# Patient Record
Sex: FEMALE | Race: WHITE | NOT HISPANIC OR LATINO | Employment: FULL TIME | URBAN - METROPOLITAN AREA
[De-identification: names, ages, dates, MRNs, and addresses within clinical notes are randomized per-mention and may not be internally consistent; named-entity substitution may affect disease eponyms.]

---

## 2014-12-15 LAB
EXTERNAL HIV CONFIRMATION: NORMAL
EXTERNAL HIV SCREEN: NORMAL
HCV AB SER-ACNC: NORMAL

## 2021-06-21 ENCOUNTER — OFFICE VISIT (OUTPATIENT)
Dept: FAMILY MEDICINE CLINIC | Facility: CLINIC | Age: 61
End: 2021-06-21
Payer: OTHER MISCELLANEOUS

## 2021-06-21 VITALS
HEIGHT: 64 IN | RESPIRATION RATE: 16 BRPM | SYSTOLIC BLOOD PRESSURE: 122 MMHG | TEMPERATURE: 97.5 F | DIASTOLIC BLOOD PRESSURE: 68 MMHG | WEIGHT: 113.2 LBS | BODY MASS INDEX: 19.33 KG/M2
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
## 2021-06-21 PROBLEM — S81.011A LACERATION OF RIGHT KNEE: Status: ACTIVE | Noted: 2021-06-21

## 2021-06-21 PROCEDURE — 99214 OFFICE O/P EST MOD 30 MIN: CPT | Performed by: FAMILY MEDICINE

## 2021-06-21 RX ORDER — FEXOFENADINE HCL 180 MG/1
180 TABLET ORAL DAILY
COMMUNITY

## 2021-06-21 RX ORDER — SODIUM FLUORIDE 1.1 G/100G
GEL, DENTIFRICE ORAL
COMMUNITY
Start: 2021-04-12

## 2021-06-21 RX ORDER — MONTELUKAST SODIUM 10 MG/1
10 TABLET ORAL DAILY
COMMUNITY
Start: 2021-04-30 | End: 2021-06-21 | Stop reason: ALTCHOICE

## 2021-06-21 RX ORDER — MONTELUKAST SODIUM 10 MG/1
TABLET ORAL
COMMUNITY
Start: 2021-04-30 | End: 2021-07-19 | Stop reason: SDUPTHER
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
## 2021-06-25 ENCOUNTER — OFFICE VISIT (OUTPATIENT)
Dept: FAMILY MEDICINE CLINIC | Facility: CLINIC | Age: 61
End: 2021-06-25
Payer: OTHER MISCELLANEOUS

## 2021-06-25 VITALS
BODY MASS INDEX: 19.63 KG/M2 | OXYGEN SATURATION: 95 % | WEIGHT: 115 LBS | HEIGHT: 64 IN | SYSTOLIC BLOOD PRESSURE: 130 MMHG | TEMPERATURE: 97 F | DIASTOLIC BLOOD PRESSURE: 80 MMHG | RESPIRATION RATE: 18 BRPM | HEART RATE: 67 BPM
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
## 2021-06-25 DIAGNOSIS — S81.011D LACERATION OF RIGHT KNEE, SUBSEQUENT ENCOUNTER: Primary | ICD-10-CM

## 2021-06-25 PROCEDURE — 99213 OFFICE O/P EST LOW 20 MIN: CPT | Performed by: FAMILY MEDICINE
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
## 2021-06-25 NOTE — ASSESSMENT & PLAN NOTE
Laceration of right needed  Using sterile instruments I removed 3 sutures from laceration of right knee  Patient with fairly well preserved range of motion with knee  She is aware to not kneel on her knee and will keep the area clean and dry
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
## 2021-06-30 ENCOUNTER — TELEPHONE (OUTPATIENT)
Dept: ADMINISTRATIVE | Facility: OTHER | Age: 61
End: 2021-06-30

## 2021-06-30 NOTE — TELEPHONE ENCOUNTER
Upon review of the In Basket request we were able to locate, review, and update the patient chart as requested for Hepatitis C  and HIV  Any additional questions or concerns should be emailed to the Practice Liaisons via [email or web address] email, please do not reply via In Basket      Thank you  Kaycee Alcantar MA

## 2021-06-30 NOTE — TELEPHONE ENCOUNTER
----- Message from Shanda Dsouza sent at 6/29/2021  2:50 PM EDT -----  Regarding: care gap request-HIV & Hep C  06/29/21 2:50 PM    Hello, our patient attached above has had Hepatitis C completed/performed  Please assist in updating the patient chart by pulling the Care Everywhere (CE) document  The date of service is 12/15/2014        06/29/21 2:51 PM    Hello, our patient Vicky Garcia has had HIV completed/performed  Please assist in updating the patient chart by pulling the Care Everywhere (CE) document  The date of service is 12/15/2014       Thank you,  Kati Singh MA  [facility]

## 2021-07-02 ENCOUNTER — OFFICE VISIT (OUTPATIENT)
Dept: FAMILY MEDICINE CLINIC | Facility: CLINIC | Age: 61
End: 2021-07-02
Payer: OTHER MISCELLANEOUS

## 2021-07-02 VITALS
WEIGHT: 116.6 LBS | SYSTOLIC BLOOD PRESSURE: 122 MMHG | HEIGHT: 64 IN | RESPIRATION RATE: 16 BRPM | BODY MASS INDEX: 19.91 KG/M2 | TEMPERATURE: 97.6 F | DIASTOLIC BLOOD PRESSURE: 70 MMHG
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
## 2021-07-02 PROCEDURE — 99213 OFFICE O/P EST LOW 20 MIN: CPT | Performed by: FAMILY MEDICINE
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
## 2021-07-19 ENCOUNTER — OFFICE VISIT (OUTPATIENT)
Dept: FAMILY MEDICINE CLINIC | Facility: CLINIC | Age: 61
End: 2021-07-19
Payer: OTHER MISCELLANEOUS

## 2021-07-19 VITALS
SYSTOLIC BLOOD PRESSURE: 132 MMHG | OXYGEN SATURATION: 99 % | TEMPERATURE: 97.5 F | HEIGHT: 64 IN | WEIGHT: 113 LBS | RESPIRATION RATE: 16 BRPM | HEART RATE: 100 BPM | DIASTOLIC BLOOD PRESSURE: 80 MMHG | BODY MASS INDEX: 19.29 KG/M2

## 2021-07-19 DIAGNOSIS — J30.9 ALLERGIC RHINITIS, UNSPECIFIED SEASONALITY, UNSPECIFIED TRIGGER: Primary | ICD-10-CM
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
## 2021-07-19 PROCEDURE — 99213 OFFICE O/P EST LOW 20 MIN: CPT | Performed by: FAMILY MEDICINE

## 2021-07-19 RX ORDER — MELOXICAM 15 MG/1
15 TABLET ORAL DAILY
Qty: 90 TABLET | Refills: 1 | Status: SHIPPED | OUTPATIENT
Start: 2021-07-19 | End: 2022-02-10

## 2021-07-19 RX ORDER — MONTELUKAST SODIUM 10 MG/1
10 TABLET ORAL DAILY
Qty: 90 TABLET | Refills: 1 | Status: SHIPPED | OUTPATIENT
Start: 2021-07-19 | End: 2021-12-22
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
## 2021-07-26 ENCOUNTER — OFFICE VISIT (OUTPATIENT)
Dept: FAMILY MEDICINE CLINIC | Facility: CLINIC | Age: 61
End: 2021-07-26
Payer: COMMERCIAL

## 2021-07-26 VITALS
HEIGHT: 64 IN | TEMPERATURE: 97.6 F | BODY MASS INDEX: 19.29 KG/M2 | DIASTOLIC BLOOD PRESSURE: 70 MMHG | RESPIRATION RATE: 16 BRPM | WEIGHT: 113 LBS | SYSTOLIC BLOOD PRESSURE: 120 MMHG
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
## 2021-07-26 PROCEDURE — 99213 OFFICE O/P EST LOW 20 MIN: CPT | Performed by: FAMILY MEDICINE

## 2021-07-26 PROCEDURE — 3008F BODY MASS INDEX DOCD: CPT | Performed by: FAMILY MEDICINE
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
## 2021-12-22 DIAGNOSIS — J30.9 ALLERGIC RHINITIS, UNSPECIFIED SEASONALITY, UNSPECIFIED TRIGGER: ICD-10-CM

## 2021-12-22 RX ORDER — MONTELUKAST SODIUM 10 MG/1
TABLET ORAL
Qty: 90 TABLET | Refills: 1 | Status: SHIPPED | OUTPATIENT
Start: 2021-12-22 | End: 2022-01-12

## 2022-01-01 ENCOUNTER — TELEMEDICINE (OUTPATIENT)
Dept: FAMILY MEDICINE CLINIC | Facility: CLINIC | Age: 62
End: 2022-01-01

## 2022-01-01 ENCOUNTER — HOSPITAL ENCOUNTER (INPATIENT)
Facility: HOSPITAL | Age: 62
LOS: 1 days | End: 2022-12-21
Attending: INTERNAL MEDICINE | Admitting: ANESTHESIOLOGY

## 2022-01-01 ENCOUNTER — APPOINTMENT (INPATIENT)
Dept: NON INVASIVE DIAGNOSTICS | Facility: HOSPITAL | Age: 62
End: 2022-01-01

## 2022-01-01 ENCOUNTER — TELEPHONE (OUTPATIENT)
Dept: FAMILY MEDICINE CLINIC | Facility: CLINIC | Age: 62
End: 2022-01-01

## 2022-01-01 ENCOUNTER — APPOINTMENT (EMERGENCY)
Dept: RADIOLOGY | Facility: HOSPITAL | Age: 62
End: 2022-01-01

## 2022-01-01 ENCOUNTER — APPOINTMENT (INPATIENT)
Dept: RADIOLOGY | Facility: HOSPITAL | Age: 62
End: 2022-01-01

## 2022-01-01 ENCOUNTER — HOSPITAL ENCOUNTER (EMERGENCY)
Facility: HOSPITAL | Age: 62
End: 2022-12-21
Attending: EMERGENCY MEDICINE

## 2022-01-01 VITALS
SYSTOLIC BLOOD PRESSURE: 88 MMHG | DIASTOLIC BLOOD PRESSURE: 64 MMHG | HEART RATE: 77 BPM | WEIGHT: 128.97 LBS | TEMPERATURE: 92.7 F | RESPIRATION RATE: 18 BRPM | BODY MASS INDEX: 22.14 KG/M2

## 2022-01-01 VITALS — HEIGHT: 64 IN | WEIGHT: 118 LBS | BODY MASS INDEX: 20.14 KG/M2

## 2022-01-01 VITALS — OXYGEN SATURATION: 56 %

## 2022-01-01 DIAGNOSIS — U07.1 COVID: ICD-10-CM

## 2022-01-01 DIAGNOSIS — U07.1 COVID-19 VIRUS INFECTION: Primary | ICD-10-CM

## 2022-01-01 DIAGNOSIS — R07.9 CHEST PAIN: ICD-10-CM

## 2022-01-01 DIAGNOSIS — I21.3 STEMI (ST ELEVATION MYOCARDIAL INFARCTION) (HCC): Primary | ICD-10-CM
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
## 2022-01-01 DIAGNOSIS — R09.89 ABSENT PEDAL PULSES: ICD-10-CM

## 2022-01-01 LAB
ALBUMIN SERPL BCP-MCNC: 2.2 G/DL (ref 3.5–5)
ALP SERPL-CCNC: 77 U/L (ref 46–116)
ALT SERPL W P-5'-P-CCNC: 58 U/L (ref 12–78)
ANION GAP SERPL CALCULATED.3IONS-SCNC: 22 MMOL/L (ref 4–13)
AST SERPL W P-5'-P-CCNC: 124 U/L (ref 5–45)
BASE EXCESS BLDA CALC-SCNC: -14 MMOL/L (ref -2–3)
BASE EXCESS BLDA CALC-SCNC: -22 MMOL/L (ref -2–3)
BASE EXCESS BLDA CALC-SCNC: -23 MMOL/L (ref -2–3)
BILIRUB SERPL-MCNC: 0.3 MG/DL (ref 0.2–1)
BUN SERPL-MCNC: 26 MG/DL (ref 5–25)
CA-I BLD-SCNC: 1 MMOL/L (ref 1.12–1.32)
CA-I BLD-SCNC: 1.04 MMOL/L (ref 1.12–1.32)
CA-I BLD-SCNC: 1.14 MMOL/L (ref 1.12–1.32)
CALCIUM ALBUM COR SERPL-MCNC: 9.6 MG/DL (ref 8.3–10.1)
CALCIUM SERPL-MCNC: 8.2 MG/DL (ref 8.3–10.1)
CARDIAC TROPONIN I PNL SERPL HS: ABNORMAL NG/L
CATH EF QUANTITATIVE: 30 %
CHLORIDE SERPL-SCNC: 95 MMOL/L (ref 96–108)
CK MB SERPL-MCNC: 115.5 NG/ML (ref 0–5)
CK MB SERPL-MCNC: 18.9 % (ref 0–2.5)
CK SERPL-CCNC: 612 U/L (ref 26–192)
CO2 SERPL-SCNC: 10 MMOL/L (ref 21–32)
CREAT SERPL-MCNC: 2.08 MG/DL (ref 0.6–1.3)
ERYTHROCYTE [DISTWIDTH] IN BLOOD BY AUTOMATED COUNT: 13.2 % (ref 11.6–15.1)
EST. AVERAGE GLUCOSE BLD GHB EST-MCNC: 111 MG/DL
FIO2 GAS DIL.REBREATH: 100 L
GFR SERPL CREATININE-BSD FRML MDRD: 24 ML/MIN/1.73SQ M
GLUCOSE SERPL-MCNC: 316 MG/DL (ref 65–140)
GLUCOSE SERPL-MCNC: 411 MG/DL (ref 65–140)
GLUCOSE SERPL-MCNC: 472 MG/DL (ref 65–140)
GLUCOSE SERPL-MCNC: 476 MG/DL (ref 65–140)
HBA1C MFR BLD: 5.5 %
HCO3 BLDA-SCNC: 11.8 MMOL/L (ref 24–30)
HCO3 BLDA-SCNC: 12.8 MMOL/L (ref 22–28)
HCO3 BLDA-SCNC: 6.3 MMOL/L (ref 24–30)
HCT VFR BLD AUTO: 44 % (ref 34.8–46.1)
HCT VFR BLD CALC: 31 % (ref 34.8–46.1)
HCT VFR BLD CALC: 37 % (ref 34.8–46.1)
HCT VFR BLD CALC: 43 % (ref 34.8–46.1)
HGB BLD-MCNC: 13.8 G/DL (ref 11.5–15.4)
HGB BLDA-MCNC: 10.5 G/DL (ref 11.5–15.4)
HGB BLDA-MCNC: 12.6 G/DL (ref 11.5–15.4)
HGB BLDA-MCNC: 14.6 G/DL (ref 11.5–15.4)
INR PPP: 1.15 (ref 0.84–1.19)
KCT BLD-ACNC: 252 SEC (ref 89–137)
LACTATE SERPL-SCNC: 14.1 MMOL/L (ref 0.5–2)
MAGNESIUM SERPL-MCNC: 2.8 MG/DL (ref 1.6–2.6)
MCH RBC QN AUTO: 30.3 PG (ref 26.8–34.3)
MCHC RBC AUTO-ENTMCNC: 31.4 G/DL (ref 31.4–37.4)
MCV RBC AUTO: 97 FL (ref 82–98)
PCO2 BLD: 14 MMOL/L (ref 21–32)
PCO2 BLD: 14 MMOL/L (ref 21–32)
PCO2 BLD: 24 MM HG (ref 42–50)
PCO2 BLD: 31.8 MM HG (ref 36–44)
PCO2 BLD: 61.9 MM HG (ref 42–50)
PCO2 BLD: 7 MMOL/L (ref 21–32)
PH BLD: 6.89 [PH] (ref 7.3–7.4)
PH BLD: 7.02 [PH] (ref 7.3–7.4)
PH BLD: 7.21 [PH] (ref 7.35–7.45)
PLATELET # BLD AUTO: 251 THOUSANDS/UL (ref 149–390)
PMV BLD AUTO: 9.3 FL (ref 8.9–12.7)
PO2 BLD: 118 MM HG (ref 75–129)
PO2 BLD: 221 MM HG (ref 35–45)
PO2 BLD: 31 MM HG (ref 35–45)
POTASSIUM BLD-SCNC: 4 MMOL/L (ref 3.5–5.3)
POTASSIUM BLD-SCNC: 4.3 MMOL/L (ref 3.5–5.3)
POTASSIUM BLD-SCNC: 4.4 MMOL/L (ref 3.5–5.3)
POTASSIUM SERPL-SCNC: 4.4 MMOL/L (ref 3.5–5.3)
PROT SERPL-MCNC: 4.9 G/DL (ref 6.4–8.4)
PROTHROMBIN TIME: 14.9 SECONDS (ref 11.6–14.5)
RBC # BLD AUTO: 4.56 MILLION/UL (ref 3.81–5.12)
SAO2 % BLD FROM PO2: 27 % (ref 60–85)
SAO2 % BLD FROM PO2: 98 % (ref 60–85)
SAO2 % BLD FROM PO2: 99 % (ref 60–85)
SODIUM BLD-SCNC: 123 MMOL/L (ref 136–145)
SODIUM BLD-SCNC: 127 MMOL/L (ref 136–145)
SODIUM BLD-SCNC: 129 MMOL/L (ref 136–145)
SODIUM SERPL-SCNC: 127 MMOL/L (ref 135–147)
SPECIMEN SOURCE: ABNORMAL
WBC # BLD AUTO: 10.37 THOUSAND/UL (ref 4.31–10.16)

## 2022-01-01 PROCEDURE — 5A12012 PERFORMANCE OF CARDIAC OUTPUT, SINGLE, MANUAL: ICD-10-PCS | Performed by: INTERNAL MEDICINE

## 2022-01-01 PROCEDURE — 4A023N7 MEASUREMENT OF CARDIAC SAMPLING AND PRESSURE, LEFT HEART, PERCUTANEOUS APPROACH: ICD-10-PCS | Performed by: INTERNAL MEDICINE

## 2022-01-01 PROCEDURE — B2111ZZ FLUOROSCOPY OF MULTIPLE CORONARY ARTERIES USING LOW OSMOLAR CONTRAST: ICD-10-PCS | Performed by: INTERNAL MEDICINE

## 2022-01-01 RX ORDER — ETOMIDATE 2 MG/ML
INJECTION INTRAVENOUS CODE/TRAUMA/SEDATION MEDICATION
Status: COMPLETED | OUTPATIENT
Start: 2022-01-01 | End: 2022-01-01

## 2022-01-01 RX ORDER — LIDOCAINE HYDROCHLORIDE 10 MG/ML
INJECTION, SOLUTION EPIDURAL; INFILTRATION; INTRACAUDAL; PERINEURAL CODE/TRAUMA/SEDATION MEDICATION
Status: DISCONTINUED | OUTPATIENT
Start: 2022-01-01 | End: 2022-01-01 | Stop reason: HOSPADM

## 2022-01-01 RX ORDER — HYDROCODONE BITARTRATE AND ACETAMINOPHEN 5; 325 MG/1; MG/1
1 TABLET ORAL EVERY 4 HOURS PRN
Status: DISCONTINUED | OUTPATIENT
Start: 2022-01-01 | End: 2022-01-01 | Stop reason: HOSPADM

## 2022-01-01 RX ORDER — NITROGLYCERIN 0.4 MG/1
0.4 TABLET SUBLINGUAL
Status: DISCONTINUED | OUTPATIENT
Start: 2022-01-01 | End: 2022-01-01 | Stop reason: HOSPADM

## 2022-01-01 RX ORDER — DIPHENOXYLATE HYDROCHLORIDE AND ATROPINE SULFATE 2.5; .025 MG/1; MG/1
1 TABLET ORAL 4 TIMES DAILY PRN
Qty: 20 TABLET | Refills: 0 | Status: SHIPPED | OUTPATIENT
Start: 2022-01-01

## 2022-01-01 RX ORDER — METOPROLOL TARTRATE 50 MG/1
25 TABLET, FILM COATED ORAL EVERY 12 HOURS SCHEDULED
Status: DISCONTINUED | OUTPATIENT
Start: 2022-01-01 | End: 2022-01-01

## 2022-01-01 RX ORDER — SODIUM BICARBONATE 84 MG/ML
INJECTION, SOLUTION INTRAVENOUS CODE/TRAUMA/SEDATION MEDICATION
Status: COMPLETED | OUTPATIENT
Start: 2022-01-01 | End: 2022-01-01

## 2022-01-01 RX ORDER — HEPARIN SODIUM 1000 [USP'U]/ML
INJECTION, SOLUTION INTRAVENOUS; SUBCUTANEOUS CODE/TRAUMA/SEDATION MEDICATION
Status: DISCONTINUED | OUTPATIENT
Start: 2022-01-01 | End: 2022-01-01 | Stop reason: HOSPADM

## 2022-01-01 RX ORDER — NIRMATRELVIR AND RITONAVIR 300-100 MG
3 KIT ORAL 2 TIMES DAILY
Qty: 30 TABLET | Refills: 0 | Status: SHIPPED | OUTPATIENT
Start: 2022-01-01 | End: 2022-12-25

## 2022-01-01 RX ORDER — SODIUM CHLORIDE 9 MG/ML
125 INJECTION, SOLUTION INTRAVENOUS CONTINUOUS
Status: DISPENSED | OUTPATIENT
Start: 2022-01-01 | End: 2022-01-01

## 2022-01-01 RX ORDER — ACETAMINOPHEN 325 MG/1
650 TABLET ORAL EVERY 4 HOURS PRN
Status: DISCONTINUED | OUTPATIENT
Start: 2022-01-01 | End: 2022-01-01 | Stop reason: HOSPADM

## 2022-01-01 RX ORDER — ATORVASTATIN CALCIUM 40 MG/1
40 TABLET, FILM COATED ORAL
Status: DISCONTINUED | OUTPATIENT
Start: 2022-01-01 | End: 2022-01-01 | Stop reason: HOSPADM

## 2022-01-01 RX ORDER — MELOXICAM 15 MG/1
TABLET ORAL
Qty: 90 TABLET | Refills: 1 | Status: SHIPPED | OUTPATIENT
Start: 2022-01-01

## 2022-01-01 RX ORDER — PHENYLEPHRINE HYDROCHLORIDE 10 MG/ML
INJECTION INTRAVENOUS
Status: DISCONTINUED
Start: 2022-01-01 | End: 2022-01-01 | Stop reason: HOSPADM

## 2022-01-01 RX ORDER — SUCCINYLCHOLINE/SOD CL,ISO/PF 100 MG/5ML
SYRINGE (ML) INTRAVENOUS CODE/TRAUMA/SEDATION MEDICATION
Status: COMPLETED | OUTPATIENT
Start: 2022-01-01 | End: 2022-01-01

## 2022-01-01 RX ORDER — HEPARIN SODIUM 5000 [USP'U]/ML
5000 INJECTION, SOLUTION INTRAVENOUS; SUBCUTANEOUS EVERY 8 HOURS SCHEDULED
Status: DISCONTINUED | OUTPATIENT
Start: 2022-01-01 | End: 2022-01-01 | Stop reason: HOSPADM

## 2022-01-01 RX ORDER — FENTANYL CITRATE 50 UG/ML
INJECTION, SOLUTION INTRAMUSCULAR; INTRAVENOUS
Status: DISCONTINUED
Start: 2022-01-01 | End: 2022-01-01 | Stop reason: HOSPADM

## 2022-01-01 RX ORDER — EPINEPHRINE 0.1 MG/ML
SYRINGE (ML) INJECTION CODE/TRAUMA/SEDATION MEDICATION
Status: COMPLETED | OUTPATIENT
Start: 2022-01-01 | End: 2022-01-01

## 2022-01-01 RX ORDER — EPINEPHRINE 1 MG/ML
INJECTION, SOLUTION, CONCENTRATE INTRAVENOUS
Status: DISCONTINUED
Start: 2022-01-01 | End: 2022-01-01 | Stop reason: HOSPADM

## 2022-01-01 RX ORDER — ASPIRIN 81 MG/1
81 TABLET, CHEWABLE ORAL DAILY
Status: DISCONTINUED | OUTPATIENT
Start: 2022-12-22 | End: 2022-01-01 | Stop reason: HOSPADM

## 2022-01-01 RX ADMIN — EPINEPHRINE 1 MG: 0.1 INJECTION INTRACARDIAC; INTRAVENOUS at 10:50

## 2022-01-01 RX ADMIN — EPINEPHRINE 1 MG: 0.1 INJECTION INTRACARDIAC; INTRAVENOUS at 10:59

## 2022-01-01 RX ADMIN — EPINEPHRINE 1 MG: 0.1 INJECTION INTRACARDIAC; INTRAVENOUS at 11:04

## 2022-01-01 RX ADMIN — EPINEPHRINE 1 MG: 0.1 INJECTION INTRACARDIAC; INTRAVENOUS at 11:02

## 2022-01-01 RX ADMIN — Medication 100 MG: at 10:59

## 2022-01-01 RX ADMIN — EPINEPHRINE 1 MG: 0.1 INJECTION INTRACARDIAC; INTRAVENOUS at 10:56

## 2022-01-01 RX ADMIN — SODIUM BICARBONATE 50 MEQ: 84 INJECTION, SOLUTION INTRAVENOUS at 10:51

## 2022-01-01 RX ADMIN — ETOMIDATE 20 MG: 20 INJECTION, SOLUTION INTRAVENOUS at 10:58

## 2022-01-01 RX ADMIN — SODIUM BICARBONATE 50 MEQ: 84 INJECTION, SOLUTION INTRAVENOUS at 11:00

## 2022-01-01 RX ADMIN — SODIUM CHLORIDE 125 ML/HR: 0.9 INJECTION, SOLUTION INTRAVENOUS at 10:10

## 2022-01-01 RX ADMIN — EPINEPHRINE 1 MG: 0.1 INJECTION INTRACARDIAC; INTRAVENOUS at 10:52

## 2022-01-01 RX ADMIN — SODIUM BICARBONATE 50 MEQ: 84 INJECTION, SOLUTION INTRAVENOUS at 10:53

## 2022-01-01 RX ADMIN — NOREPINEPHRINE BITARTRATE 4 MCG/MIN: 1 INJECTION INTRAVENOUS at 10:23

## 2022-01-12 DIAGNOSIS — J30.9 ALLERGIC RHINITIS, UNSPECIFIED SEASONALITY, UNSPECIFIED TRIGGER: ICD-10-CM

## 2022-01-12 RX ORDER — MONTELUKAST SODIUM 10 MG/1
TABLET ORAL
Qty: 90 TABLET | Refills: 1 | Status: SHIPPED | OUTPATIENT
Start: 2022-01-12 | End: 2022-01-13

## 2022-01-13 DIAGNOSIS — J30.9 ALLERGIC RHINITIS, UNSPECIFIED SEASONALITY, UNSPECIFIED TRIGGER: ICD-10-CM

## 2022-01-13 RX ORDER — MONTELUKAST SODIUM 10 MG/1
TABLET ORAL
Qty: 90 TABLET | Refills: 1 | Status: SHIPPED | OUTPATIENT
Start: 2022-01-13 | End: 2022-01-14

## 2022-01-14 DIAGNOSIS — J30.9 ALLERGIC RHINITIS, UNSPECIFIED SEASONALITY, UNSPECIFIED TRIGGER: ICD-10-CM

## 2022-01-14 RX ORDER — MONTELUKAST SODIUM 10 MG/1
TABLET ORAL
Qty: 90 TABLET | Refills: 1 | Status: SHIPPED | OUTPATIENT
Start: 2022-01-14 | End: 2022-01-17

## 2022-01-15 DIAGNOSIS — J30.9 ALLERGIC RHINITIS, UNSPECIFIED SEASONALITY, UNSPECIFIED TRIGGER: ICD-10-CM

## 2022-01-17 RX ORDER — MONTELUKAST SODIUM 10 MG/1
TABLET ORAL
Qty: 90 TABLET | Refills: 1 | Status: SHIPPED | OUTPATIENT
Start: 2022-01-17
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
## 2022-02-10 RX ORDER — MELOXICAM 15 MG/1
TABLET ORAL
Qty: 90 TABLET | Refills: 1 | Status: SHIPPED | OUTPATIENT
Start: 2022-02-10 | End: 2022-02-17
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
## 2022-02-17 RX ORDER — MELOXICAM 15 MG/1
TABLET ORAL
Qty: 90 TABLET | Refills: 1 | Status: SHIPPED | OUTPATIENT
Start: 2022-02-17 | End: 2022-07-31

## 2022-05-27 ENCOUNTER — OFFICE VISIT (OUTPATIENT)
Dept: URGENT CARE | Facility: CLINIC | Age: 62
End: 2022-05-27
Payer: COMMERCIAL

## 2022-05-27 VITALS
DIASTOLIC BLOOD PRESSURE: 68 MMHG | TEMPERATURE: 98.9 F | HEART RATE: 78 BPM | OXYGEN SATURATION: 99 % | SYSTOLIC BLOOD PRESSURE: 129 MMHG | RESPIRATION RATE: 18 BRPM

## 2022-05-27 DIAGNOSIS — L03.011 PARONYCHIA OF FINGER OF RIGHT HAND: Primary | ICD-10-CM

## 2022-05-27 DIAGNOSIS — L02.91 ABSCESS: ICD-10-CM

## 2022-05-27 PROCEDURE — 99213 OFFICE O/P EST LOW 20 MIN: CPT | Performed by: PHYSICIAN ASSISTANT

## 2022-05-27 RX ORDER — CLINDAMYCIN HYDROCHLORIDE 300 MG/1
300 CAPSULE ORAL 4 TIMES DAILY
Qty: 40 CAPSULE | Refills: 0 | Status: SHIPPED | OUTPATIENT
Start: 2022-05-27 | End: 2022-06-06

## 2022-05-27 NOTE — PROGRESS NOTES
[facility]        NAME: Angelica Mukherjee is a 59 y o  female  : 1960    MRN: [id number]  DATE: May 27, 2022  TIME: 7:03 PM    /68   Pulse 78   Temp 98 9 °F (37 2 °C)   Resp 18   SpO2 99%     Assessment and Plan   Paronychia of finger of right hand [L03 011]  1  Paronychia of finger of right hand  clindamycin (CLEOCIN) 300 MG capsule   2  Abscess  clindamycin (CLEOCIN) 300 MG capsule         Patient Instructions       Follow up with PCP in 3-5 days  Proceed to  ER if symptoms worsen  Chief Complaint   No chief complaint on file  History of Present Illness       Pt with right 3rd finger pain and swelling for several days also with bump/boil on her back x 2 days       Review of Systems   Review of Systems   Constitutional: Negative  HENT: Negative  Eyes: Negative  Respiratory: Negative  Cardiovascular: Negative  Gastrointestinal: Negative  Endocrine: Negative  Genitourinary: Negative  Musculoskeletal: Negative  Skin: Positive for wound  Allergic/Immunologic: Negative  Neurological: Negative  Hematological: Negative  Psychiatric/Behavioral: Negative  All other systems reviewed and are negative          Current Medications       Current Outpatient Medications:     clindamycin (CLEOCIN) 300 MG capsule, Take 1 capsule (300 mg total) by mouth 4 (four) times a day for 10 days, Disp: 40 capsule, Rfl: 0    Denta 5000 Plus 1 1 % CREA, Use as directed, Disp: , Rfl:     fexofenadine (ALLEGRA) 180 MG tablet, Take 180 mg by mouth daily, Disp: , Rfl:     meloxicam (MOBIC) 15 mg tablet, TAKE ONE TABLET BY MOUTH EVERY DAY, Disp: 90 tablet, Rfl: 1    montelukast (SINGULAIR) 10 mg tablet, TAKE ONE TABLET BY MOUTH EVERY DAY, Disp: 90 tablet, Rfl: 1    Current Allergies     Allergies as of 2022 - Reviewed 2022   Allergen Reaction Noted    Penicillins Other (See Comments) 2011            The following portions of the patient's history were reviewed and updated as appropriate: allergies, current medications, past family history, past medical history, past social history, past surgical history and problem list      History reviewed  No pertinent past medical history  Past Surgical History:   Procedure Laterality Date    APPENDECTOMY         History reviewed  No pertinent family history  Medications have been verified  Objective   /68   Pulse 78   Temp 98 9 °F (37 2 °C)   Resp 18   SpO2 99%        Physical Exam     Physical Exam  Vitals and nursing note reviewed  Constitutional:       Appearance: Normal appearance  She is normal weight  HENT:      Head: Normocephalic and atraumatic  Right Ear: Tympanic membrane, ear canal and external ear normal       Left Ear: Tympanic membrane, ear canal and external ear normal       Nose: Nose normal       Mouth/Throat:      Mouth: Mucous membranes are moist       Pharynx: Oropharynx is clear  Eyes:      Extraocular Movements: Extraocular movements intact  Conjunctiva/sclera: Conjunctivae normal       Pupils: Pupils are equal, round, and reactive to light  Cardiovascular:      Rate and Rhythm: Normal rate and regular rhythm  Pulses: Normal pulses  Heart sounds: Normal heart sounds  Pulmonary:      Effort: Pulmonary effort is normal       Breath sounds: Normal breath sounds  Abdominal:      General: Abdomen is flat  Bowel sounds are normal    Musculoskeletal:         General: Normal range of motion  Cervical back: Normal range of motion and neck supple  Comments: Right 3rd finger paronychia, slight pad swelling  From all joints    Skin:     General: Skin is warm  Capillary Refill: Capillary refill takes less than 2 seconds  Comments: Left back 2cm draining yellow open abscess    Neurological:      General: No focal deficit present  Mental Status: She is alert and oriented to person, place, and time     Psychiatric:         Mood and Affect: Mood normal          Behavior: Behavior normal

## 2022-05-30 ENCOUNTER — OFFICE VISIT (OUTPATIENT)
Dept: URGENT CARE | Age: 62
End: 2022-05-30

## 2022-05-30 VITALS
DIASTOLIC BLOOD PRESSURE: 78 MMHG | SYSTOLIC BLOOD PRESSURE: 138 MMHG | OXYGEN SATURATION: 98 % | TEMPERATURE: 97.8 F | RESPIRATION RATE: 18 BRPM | HEART RATE: 84 BPM

## 2022-05-30 DIAGNOSIS — L03.011 PARONYCHIA OF FINGER, RIGHT: ICD-10-CM

## 2022-05-30 DIAGNOSIS — K59.00 CONSTIPATION, UNSPECIFIED CONSTIPATION TYPE: ICD-10-CM

## 2022-05-30 DIAGNOSIS — L02.222 FURUNCLE OF BACK, EXCEPT BUTTOCK: Primary | ICD-10-CM

## 2022-05-30 RX ORDER — DOXYCYCLINE 100 MG/1
100 TABLET ORAL 2 TIMES DAILY
Qty: 10 TABLET | Refills: 0 | Status: SHIPPED | OUTPATIENT
Start: 2022-05-30 | End: 2022-06-04

## 2022-05-30 NOTE — PROGRESS NOTES
[id number]        NAME: Nelly Denny is a 59 y o  female  : 1960    MRN: [id number]  DATE: May 30, 2022  TIME: 4:12 PM    Assessment and Plan   Furuncle of back, except buttock [L02 222]  1  Furuncle of back, except buttock  doxycycline (ADOXA) 100 MG tablet   2  Paronychia of finger, right  doxycycline (ADOXA) 100 MG tablet   3  Constipation, unspecified constipation type       --New DSD applied to draining furuncle on back  --PCN allergy  Patient Instructions     --Stop clindamycin  Start doxycycline  Avoid excess sun exposure while taking    --Continue warm soaks, OTC topical antibiotic, dressing changes per previous instructions    --OTC Miralax +/- stool softener (Colace) once a day as needed for ongoing constipation  --Follow-up with PCP for no improvement/worsening over the next 3-5 days  Chief Complaint     Chief Complaint   Patient presents with    Finger Pain     Recently tx  [facility] 2022, with rx  Abx   Mass     Boil on back    Constipation    Medication Reaction     C/o medication making her sick         History of Present Illness       Here with complaints of upset stomach, constipation as the result of her IBS + Rx antibiotic (clindamycin) which she has been taking for 3 days since being seen on  for draining paronychia on right 3rd finger as well as draining boil on back x 1 week prior  Seems to be improving, with decreased tenderness, redness, drainage  Mild nausea  No abdominal pain at present  No vomiting  Tried OTC Senna, but no BM for the past 3 days  No reported history of MRSA  Review of Systems   Review of Systems   Constitutional: Negative for fever  Respiratory: Negative for shortness of breath      Gastrointestinal:        Per HPI   Skin:        Per HPI         Current Medications       Current Outpatient Medications:     doxycycline (ADOXA) 100 MG tablet, Take 1 tablet (100 mg total) by mouth 2 (two) times a day for 5 days, Disp: 10 tablet, Rfl: 0    clindamycin (CLEOCIN) 300 MG capsule, Take 1 capsule (300 mg total) by mouth 4 (four) times a day for 10 days, Disp: 40 capsule, Rfl: 0    Denta 5000 Plus 1 1 % CREA, Use as directed, Disp: , Rfl:     fexofenadine (ALLEGRA) 180 MG tablet, Take 180 mg by mouth daily, Disp: , Rfl:     meloxicam (MOBIC) 15 mg tablet, TAKE ONE TABLET BY MOUTH EVERY DAY, Disp: 90 tablet, Rfl: 1    montelukast (SINGULAIR) 10 mg tablet, TAKE ONE TABLET BY MOUTH EVERY DAY, Disp: 90 tablet, Rfl: 1    Current Allergies     Allergies as of 05/30/2022 - Reviewed 05/30/2022   Allergen Reaction Noted    Penicillins Other (See Comments) 01/08/2011            The following portions of the patient's history were reviewed and updated as appropriate: allergies, current medications, past family history, past medical history, past social history, past surgical history and problem list      History reviewed  No pertinent past medical history  Past Surgical History:   Procedure Laterality Date    APPENDECTOMY         No family history on file  Medications have been verified  Objective   /78   Pulse 84   Temp 97 8 °F (36 6 °C)   Resp 18   SpO2 98%   No LMP recorded  Physical Exam     Physical Exam  Constitutional:       General: She is not in acute distress  Skin:     Findings: Erythema present  Comments: Pad of right 3rd digit with mild swelling, erythema, tenderness, small amount of purulent drainage just beneath distal nail  Remainder of finger pad, as well as nailfolds, nontender with normal appearance  Upper mid back with small (1 5 cm) minimally protuberant furuncle with open central pore draining small amount of yellow, purulent drainage, surrounded by 1 cm rim of mildly erythematous, mildly tender, minimally indurated skin  No underlying fluctuance  Neurological:      Mental Status: She is alert     Psychiatric:         Mood and Affect: Mood normal

## 2022-05-30 NOTE — PATIENT INSTRUCTIONS
--Stop clindamycin  Start doxycycline  Avoid excess sun exposure while taking    --Continue warm soaks, OTC topical antibiotic, dressing changes per previous instructions    --OTC Miralax +/- stool softener (Colace) once a day as needed for ongoing constipation  --Follow-up with PCP for no improvement/worsening over the next 3-5 days

## 2022-05-31 ENCOUNTER — APPOINTMENT (OUTPATIENT)
Dept: URGENT CARE | Age: 62
End: 2022-05-31
Payer: COMMERCIAL
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
## 2022-07-31 RX ORDER — MELOXICAM 15 MG/1
TABLET ORAL
Qty: 90 TABLET | Refills: 1 | Status: SHIPPED | OUTPATIENT
Start: 2022-07-31
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
## 2022-10-12 PROBLEM — S81.011A LACERATION OF RIGHT KNEE: Status: RESOLVED | Noted: 2021-06-21 | Resolved: 2022-10-12

## 2022-12-02 ENCOUNTER — OFFICE VISIT (OUTPATIENT)
Dept: URGENT CARE | Facility: CLINIC | Age: 62
End: 2022-12-02

## 2022-12-02 VITALS
HEART RATE: 73 BPM | SYSTOLIC BLOOD PRESSURE: 139 MMHG | HEIGHT: 64 IN | WEIGHT: 118 LBS | OXYGEN SATURATION: 98 % | DIASTOLIC BLOOD PRESSURE: 61 MMHG | BODY MASS INDEX: 20.14 KG/M2 | TEMPERATURE: 97.4 F | RESPIRATION RATE: 17 BRPM

## 2022-12-02 DIAGNOSIS — L03.011 PARONYCHIA OF FINGER, RIGHT: Primary | ICD-10-CM

## 2022-12-02 RX ORDER — SULFAMETHOXAZOLE AND TRIMETHOPRIM 800; 160 MG/1; MG/1
1 TABLET ORAL EVERY 12 HOURS SCHEDULED
Qty: 10 TABLET | Refills: 0 | Status: SHIPPED | OUTPATIENT
Start: 2022-12-02 | End: 2022-12-07

## 2022-12-02 NOTE — PROGRESS NOTES
[facility]        NAME: Garrett Mishra is a 57 y o  female  : 1960    MRN: [id number]  DATE: 2022  TIME: 3:46 PM    Assessment and Plan   Paronychia of finger, right [L03 011]  1  Paronychia of finger, right  sulfamethoxazole-trimethoprim (BACTRIM DS) 800-160 mg per tablet            Patient Instructions       Follow up with PCP in 3-5 days  Proceed to  ER if symptoms worsen  Chief Complaint     Chief Complaint   Patient presents with   • Finger Swelling     PT presents with right middle finger redness and swelling  Pt has hx of paronychia and fungal infection of the finger that she treats at home with antifungal medication  History of Present Illness       15-year-old female with 1-2 week history of increased redness, swelling and discharge from the nail bed of her middle finger  Review of Systems   Review of Systems   Constitutional: Negative  HENT: Negative  Eyes: Negative  Respiratory: Negative  Cardiovascular: Negative  Gastrointestinal: Negative  Endocrine: Negative  Genitourinary: Negative  Musculoskeletal: Negative  Allergic/Immunologic: Negative  Neurological: Negative  Hematological: Negative  Psychiatric/Behavioral: Negative            Current Medications       Current Outpatient Medications:   •  Denta 5000 Plus 1 1 % CREA, Use as directed, Disp: , Rfl:   •  fexofenadine (ALLEGRA) 180 MG tablet, Take 180 mg by mouth daily, Disp: , Rfl:   •  meloxicam (MOBIC) 15 mg tablet, TAKE ONE TABLET BY MOUTH EVERY DAY, Disp: 90 tablet, Rfl: 1  •  montelukast (SINGULAIR) 10 mg tablet, TAKE ONE TABLET BY MOUTH EVERY DAY, Disp: 90 tablet, Rfl: 1  •  sulfamethoxazole-trimethoprim (BACTRIM DS) 800-160 mg per tablet, Take 1 tablet by mouth every 12 (twelve) hours for 5 days, Disp: 10 tablet, Rfl: 0    Current Allergies     Allergies as of 2022 - Reviewed 2022   Allergen Reaction Noted   • Penicillins Other (See Comments) 01/08/2011            The following portions of the patient's history were reviewed and updated as appropriate: allergies, current medications, past family history, past medical history, past social history, past surgical history and problem list      History reviewed  No pertinent past medical history  Past Surgical History:   Procedure Laterality Date   • APPENDECTOMY         No family history on file  Medications have been verified  Objective   /61   Pulse 73   Temp (!) 97 4 °F (36 3 °C)   Resp 17   Ht 5' 4" (1 626 m)   Wt 53 5 kg (118 lb)   SpO2 98%   BMI 20 25 kg/m²   No LMP recorded  Physical Exam     Physical Exam  Vitals and nursing note reviewed  Constitutional:       General: Vital signs are normal       Appearance: She is well-developed  HENT:      Head: Normocephalic  Eyes:      Pupils: Pupils are equal, round, and reactive to light  Pulmonary:      Effort: Pulmonary effort is normal    Musculoskeletal:         General: Normal range of motion  Skin:     General: Skin is warm and dry  Findings: Erythema present  Neurological:      Mental Status: She is alert and oriented to person, place, and time     Psychiatric:         Mood and Affect: Mood and affect normal
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
## 2022-12-15 RX ORDER — MELOXICAM 15 MG/1
TABLET ORAL
Qty: 90 TABLET | Refills: 1 | Status: SHIPPED | OUTPATIENT
Start: 2022-12-15 | End: 2022-12-20

## 2022-12-20 NOTE — PATIENT INSTRUCTIONS
Because of lack of vaccination, will treat with pack Slo-Bid 3 tablets twice daily for 5 days  Lomotil 1 tablet 4 times a day as needed for diarrhea  Avoid milk products  Also suggest 2 extra strength Tylenol 3 times a day for aches and pains  Based on current recommendations, should be quarantined for total of 5 days which would be through 2 days from now  After that, should wear a mask when she goes out for the next 5 days

## 2022-12-20 NOTE — PROGRESS NOTES
COVID-19 Outpatient Progress Note    Assessment/Plan:    Problem List Items Addressed This Visit    None  Visit Diagnoses     COVID-19 virus infection    -  Primary         Disposition:     Patient meets criteria for PAXLOVID and they have been counseled appropriately according to EUA documentation released by the FDA  After discussion, patient agrees to treatment  Modockeyana Royalsing is an investigational medicine used to treat mild-to-moderate COVID-19 in adults and children (15years of age and older weighing at least 80 pounds (40 kg)) with positive results of direct SARS-CoV-2 viral testing, and who are at high risk for progression to severe COVID-19, including hospitalization or death  PAXLOVID is investigational because it is still being studied  There is limited information about the safety and effectiveness of using PAXLOVID to treat people with mild-to-moderate COVID-19  The FDA has authorized the emergency use of PAXLOVID for the treatment of mild-tomoderate COVID-19 in adults and children (15years of age and older weighing at least 80 pounds (40 kg)) with a positive test for the virus that causes COVID-19, and who are at high risk for progression to severe COVID-19, including hospitalization or death, under an EUA  What should I tell my healthcare provider before I take PAXLOVID? Tell your healthcare provider if you:  - Have any allergies  - Have liver or kidney disease  - Are pregnant or plan to become pregnant  - Are breastfeeding a child  - Have any serious illnesses    Tell your healthcare provider about all the medicines you take, including prescription and over-the-counter medicines, vitamins, and herbal supplements  Some medicines may interact with PAXLOVID and may cause serious side effects  Keep a list of your medicines to show your healthcare provider and pharmacist when you get a new medicine      You can ask your healthcare provider or pharmacist for a list of medicines that interact with PAXLOVID  Do not start taking a new medicine without telling your healthcare provider  Your healthcare provider can tell you if it is safe to take PAXLOVID with other medicines  Tell your healthcare provider if you are taking combined hormonal contraceptive  PAXLOVID may affect how your birth control pills work  Females who are able to become pregnant should use another effective alternative form of contraception or an additional barrier method of contraception  Talk to your healthcare provider if you have any questions about contraceptive methods that might be right for you  How do I take PAXLOVID? PAXLOVID consists of 2 medicines: nirmatrelvir and ritonavir  - Take 2 pink tablets of nirmatrelvir with 1 white tablet of ritonavir by mouth 2 times each day (in the morning and in the evening) for 5 days  For each dose, take all 3 tablets at the same time  - If you have kidney disease, talk to your healthcare provider  You may need a different dose  - Swallow the tablets whole  Do not chew, break, or crush the tablets  - Take PAXLOVID with or without food  - Do not stop taking PAXLOVID without talking to your healthcare provider, even if you feel better  - If you miss a dose of PAXLOVID within 8 hours of the time it is usually taken, take it as soon as you remember  If you miss a dose by more than 8 hours, skip the missed dose and take the next dose at your regular time  Do not take 2 doses of PAXLOVID at the same time  - If you take too much PAXLOVID, call your healthcare provider or go to the nearest hospital emergency room right away  - If you are taking a ritonavir- or cobicistat-containing medicine to treat hepatitis C or Human Immunodeficiency Virus (HIV), you should continue to take your medicine as prescribed by your healthcare provider   - Talk to your healthcare provider if you do not feel better or if you feel worse after 5 days  Who should generally not take PAXLOVID?     Do not take PAXLOVID if:  You are allergic to nirmatrelvir, ritonavir, or any of the ingredients in PAXLOVID  You are taking any of the following medicines:  - Alfuzosin  - Pethidine, piroxicam, propoxyphene  - Ranolazine  - Amiodarone, dronedarone, flecainide, propafenone, quinidine  - Colchicine  - Lurasidone, pimozide, clozapine  - Dihydroergotamine, ergotamine, methylergonovine  - Lovastatin, simvastatin  - Sildenafil (Revatio®) for pulmonary arterial hypertension (PAH)  - Triazolam, oral midazolam  - Apalutamide  - Carbamazepine, phenobarbital, phenytoin  - Rifampin  - St  Kvng’s Wort (hypericum perforatum)    What are the important possible side effects of PAXLOVID? Possible side effects of PAXLOVID are:  - Liver Problems  Tell your healthcare provider right away if you have any of these signs and symptoms of liver problems: loss of appetite, yellowing of your skin and the whites of eyes (jaundice), dark-colored urine, pale colored stools and itchy skin, stomach area (abdominal) pain  - Resistance to HIV Medicines  If you have untreated HIV infection, PAXLOVID may lead to some HIV medicines not working as well in the future  - Other possible side effects include: altered sense of taste, diarrhea, high blood pressure, or muscle aches    These are not all the possible side effects of PAXLOVID  Not many people have taken PAXLOVID  Serious and unexpected side effects may happen  Hedda Cool is still being studied, so it is possible that all of the risks are not known at this time  What other treatment choices are there? Like Jimtta Loge may allow for the emergency use of other medicines to treat people with COVID-19  Go to https://[employer]/ for information on the emergency use of other medicines that are authorized by FDA to treat people with COVID-19   Your healthcare provider may talk with you about clinical trials for which you may be eligible  It is your choice to be treated or not to be treated with PAXLOVID  Should you decide not to receive it or for your child not to receive it, it will not change your standard medical care  What if I am pregnant or breastfeeding? There is no experience treating pregnant women or breastfeeding mothers with PAXLOVID  For a mother and unborn baby, the benefit of taking PAXLOVID may be greater than the risk from the treatment  If you are pregnant, discuss your options and specific situation with your healthcare provider  It is recommended that you use effective barrier contraception or do not have sexual activity while taking PAXLOVID  If you are breastfeeding, discuss your options and specific situation with your healthcare provider  How do I report side effects with PAXLOVID? Contact your healthcare provider if you have any side effects that bother you or do not go away  Report side effects to FDA MedWatch at www [employer] gov/medwatch or call 8-773-BHR9457 or you can report side effects to [employer]  at the contact information provided below  Website Fax number Telephone number   [employer] [phone]     How should I store [address]? Store PAXLOVID tablets at room temperature between 68°F to 77°F (20°C to 25°C)  Full fact sheet for patients, parents, and caregivers can be found at: [employer]    I have spent 12 minutes directly with the patient  Because of lack of vaccination, will treat with pack Slo-Bid 3 tablets twice daily for 5 days  Lomotil 1 tablet 4 times a day as needed for diarrhea  Avoid milk products  Also suggest 2 extra strength Tylenol 3 times a day for aches and pains  Based on current recommendations, should be quarantined for total of 5 days which would be through 2 days from now    After that, should wear a mask when she goes out for the next 5 days       Encounter provider: Donte Botello MD     Provider located at: [address] [place] [phone]     Recent Visits  No visits were found meeting these conditions  Showing recent visits within past 7 days and meeting all other requirements  Today's Visits  Date Type Provider Dept   12/20/22 Telemedicine Donte Botello MD [facility] today's visits and meeting all other requirements  Future Appointments  No visits were found meeting these conditions  Showing future appointments within next 150 days and meeting all other requirements     This virtual check-in was done via to[employer] and patient was informed that this is a secure, HIPAA-compliant platform  She agrees to proceed  Patient agrees to participate in a virtual check in via telephone or video visit instead of presenting to the office to address urgent/immediate medical needs  Patient is aware this is a billable service  She acknowledged consent and understanding of privacy and security of the video platform  The patient has agreed to participate and understands they can discontinue the visit at any time  After connecting through [facility], the patient was identified by name and date of birth  Samantha Em was informed that this was a telemedicine visit and that the exam was being conducted confidentially over secure lines  My office door was closed  No one else was in the room  Samantha Em acknowledged consent and understanding of privacy and security of the telemedicine visit  I informed the patient that I have reviewed her record in Epic and presented the opportunity for her to ask any questions regarding the visit today  The patient agreed to participate  Verification of patient location:  Patient is located in the following state in which I hold an active license: [place]    Subjective:   Samantha Em is a 57 y o  female who is concerned about COVID-19  Patient's symptoms include shortness of breath, diarrhea, myalgias and headache  Patient denies fever, chills, congestion, sore throat and cough  - Date of symptom onset: 12/18/2022      COVID-19 vaccination status: Not vaccinated    Home test for COVID was positive yesterday  No results found for: Camren Petit, SARSCORONAVI, CORONAVIRUSR, SARSCOVAG, [facility]    Review of Systems   Constitutional: Negative for chills and fever  HENT: Negative for congestion and sore throat  Respiratory: Positive for shortness of breath  Negative for cough  Gastrointestinal: Positive for diarrhea  Musculoskeletal: Positive for myalgias  Neurological: Positive for headaches  Current Outpatient Medications on File Prior to Visit   Medication Sig   • Denta 5000 Plus 1 1 % CREA Use as directed   • fexofenadine (ALLEGRA) 180 MG tablet Take 180 mg by mouth daily   • meloxicam (MOBIC) 15 mg tablet TAKE ONE TABLET BY MOUTH EVERY DAY   • montelukast (SINGULAIR) 10 mg tablet TAKE ONE TABLET BY MOUTH EVERY DAY   • [DISCONTINUED] meloxicam (MOBIC) 15 mg tablet TAKE ONE TABLET BY MOUTH EVERY DAY       Objective:    Ht 5' 4" (1 626 m)   Wt 53 5 kg (118 lb)   BMI 20 25 kg/m²      Physical Exam   Appears comfortable laying in bed  No shortness of breath noted    Marcelle Thurman MD

## 2022-12-21 NOTE — PROGRESS NOTES
Spiritual Care Progress Note    2022  Patient: Tino Gutierrez : 1960  Admission Date & Time: 2022 0850  MRN: [id number] CSN: [id number]      Ramona Collet reported to the MICU at the suggestion of the Hospital Supervisor for the pt who was currently coding  Upon arriving in the unit, the  received word that the pt's care team were actively performing CPR on the pt and that the pt's mother was on the phone being asked to make a decision about care  Pt's mom decided to hold CPR and allow the pt to    provided support via phone to the pt's mom and on another encounter to the pt's s/o   provided silent prayer for the pt and the pt's family   remains available                 Chaplaincy Interventions Utilized:   Empowerment: Normalized experience of patient/family, Provided anticipatory guidance, Provided chaplaincy education, and Provided grief counseling    Exploration: Explored emotional needs & resources, Explored relational needs & resources, and Explored spiritual needs & resources    Collaboration: Consulted with interdisciplinary team and Facilitated respect for spiritual/cultural practice during hospitalization    Relationship Building: Cultivated a relationship of care and support, Listened empathically, and Provided silent and supportive presence    Ritual: Facilitated postmortem needs/rituals and Provided prayer       22 1400   Clinical Encounter Type   Visited With Health care provider;Family   Crisis Visit Code;Death   Referral From Other (Comment)  [facility] HOSPITAL Supervisor)   Referral To    [facility] Encounters   [facility] Needs Prayer

## 2022-12-21 NOTE — STEMI DOCUMENTATION
Per EMS- called to patients home for positive Covid and abd pain,  patient developed chest pain on way to hospital, upon arrival EMS stated Stemi, EKG positive for Stemi, several attempts for a second IV unsuccessful and unable to obtain blood work, advised by MD to transfer patient without labs due to critical nature  Xray obtained , medications given at bedside-aspirin 324 mg chewable, brilinta 180 mg IV , and fentanyl 25 mcg IV   ALS present for STAT transfer to [facility] per Dr Roberth Palafox   All  belongings given to EMS at time of transfer, medical necessity and facesheet provided to EMS, April-RN called [facility] to provide report

## 2022-12-21 NOTE — CODE DOCUMENTATION
41-year-old female her chart review chest tobacco use disorder was recently diagnosed with COVID-19 pneumonia earlier this week  She was started on Paxlovid  Per EMS reports called 911 complaining of severe epigastric abdominal pain radiating to her back and her significant other found her on the floor in pain  On arrival her EKG showed ST elevation in inferior lateral leads with ST depressions in early anterior precordial leads  MI alert was called and the emergency room contacted Dr Aime Alexander prompted the patient to be sent directly to [facility]  The patient received 325 of aspirin and 150 mg of Brilinta  On arrival to [facility] she had a manual pressure cuff of 80s over 60s and had a wide open normal saline liter of fluid  Femoral line and arterial access were obtained in the right groin  Initial cath showed clean coronary arteries LV gram showed significant cardiomyopathy with a EF of probably 20%  While the patient was on the table she was also complaining of severe back pain  The aortic gram with no evidence of dissection  I performed a bedside echo that showed a severe cardiomyopathy with biventricular failure and mild pericardial effusion with no tamponade physiology  IVC was flat  The patient was hemodynamically with pressures 90s over 60s off for right femoral arterial line and cuff pressure stable in sinus with persistent ST elevation  She was not complaining of chest pain but still back pain  She was also now complaining of severe right leg pain and did not have any Doppler pulses of her right PT or DP  She did have faint Doppler pulses plus one of her left PT and DP  The patient and the case was discussed with the cardiac ICU critical care attending  The patient was planned to go to MICU 5  I moved the patient with the cardiac cath staff to MICU 5 where the cardiac critical care fellow was at bedside  , lactate acid of 14, hanna with metabolic acidosis  At this point I was concerned for severe biventricular failure possibly due to viral covid myocarditis  On arrival to the medical intensive care unit The patient was talking to me with blood pressure of 90/60 on levophed of 4 off her femoral arterial line  The patient was still complaining of severe right leg pain and had no pulses distally in her leg  Reviewing her films of her groin shot I was concerned the arterial sheath was causing occlusion  I decided to remove the arterial sheath and hold pressure  AT the same time the patient was still comfortable and becoming more lethargic with soft pressures now with escalating levels of levophed  The MICU attending was prompted to come bedside as the patient was further decompensated and I contacted the interventional fellow with the CICU attending about the acute events  Bedside TTE still showed severe biventricular failure  AT that time while we were attempting to get brachial arterial line access the patient lost pulses  While running the code the patient at times  had asystole and others slow junctional rhythm PEA  She never had a shockable rhythm  She started to vomit while the MICU attending tried to intubate the patient  Multiple rounds of epi/bicarb/calcium were given while levophed was at 30 and epi 10 with IV fluids wide open  The patient was deemed not a mechanical support candidate considering her access issues and covid infection  The mother [facility]) was informed of the events and the mother wanted us to stop the code  The code was stopped after 16 minutes with MICU and CICU attendings at bedside      Fallon Peñaloza

## 2022-12-21 NOTE — DISCHARGE SUMMARY
Discharge Summary - Gaurav Addisonr 57 y o  female MRN: [id number]    Unit/Bed#: MICU 05 Encounter: [id number] PCP: Lacy Schreiber MD    Admission Date:   Admission Orders (From admission, onward)     Ordered        12/21/22 0951  Inpatient Admission  Once                        Admitting Diagnosis: STEMI (ST elevation myocardial infarction) ([facility] [address] ) [I21 3]    HPI: Per Dr Hoda Newell H&P: 15-year-old female her chart review chest tobacco use disorder was recently diagnosed with COVID-19 pneumonia earlier this week   She was started on Paxlovid   Per EMS reports called 911 complaining of severe epigastric abdominal pain radiating to her back and her significant other found her on the floor in pain   On arrival her EKG showed ST elevation in inferior lateral leads with ST depressions in early anterior precordial leads   MI alert was called and the emergency room contacted Dr Susanna Grant Can the patient to be sent directly to [facility] patient received 325 of aspirin and 150 mg of Brilinta   On arrival to [place] she had a manual pressure cuff of 80s over 60s and had wide open normal saline liter of fluid  Procedures Performed:   Orders Placed This Encounter   Procedures   • Cardiac catheterization       Summary of Hospital Course:  Per Dr Roseanne Argueta Documentation:  15-year-old female her chart review chest tobacco use disorder was recently diagnosed with COVID-19 pneumonia earlier this week  She was started on Paxlovid  Per EMS reports called 911 complaining of severe epigastric abdominal pain radiating to her back and her significant other found her on the floor in pain  On arrival her EKG showed ST elevation in inferior lateral leads with ST depressions in early anterior precordial leads  MI alert was called and the emergency room contacted Dr Susanna Mckeon prompted the patient to be sent directly to [place]  The patient received 325 of aspirin and 150 mg of Brilinta  On arrival to [place] she had a manual pressure cuff of 80s over 60s and had a wide open normal saline liter of fluid       Femoral line and arterial access were obtained in the right groin  Initial cath showed clean coronary arteries LV gram showed significant cardiomyopathy with a EF of probably 20%  While the patient was on the table she was also complaining of severe back pain  The aortic gram with no evidence of dissection  I performed a bedside echo that showed a severe cardiomyopathy with biventricular failure and mild pericardial effusion with no tamponade physiology  IVC was flat      The patient was hemodynamically with pressures 90s over 60s off for right femoral arterial line and cuff pressure stable in sinus with persistent ST elevation  She was not complaining of chest pain but still back pain  She was also now complaining of severe right leg pain and did not have any Doppler pulses of her right PT or DP  She did have faint Doppler pulses plus one of her left PT and DP  The patient and the case was discussed with the cardiac ICU critical care attending  The patient was planned to go to MICU 5  I moved the patient with the cardiac cath staff to MICU 5 where the cardiac critical care fellow was at bedside       , lactate acid of 14, hanna with metabolic acidosis  At this point I was concerned for severe biventricular failure possibly due to viral covid myocarditis  On arrival to the medical intensive care unit The patient was talking to me with blood pressure of 90/60 on levophed of 4 off her femoral arterial line  The patient was still complaining of severe right leg pain and had no pulses distally in her leg  Reviewing her films of her groin shot I was concerned the arterial sheath was causing occlusion  I decided to remove the arterial sheath and hold pressure   AT the same time the patient was still comfortable and becoming more lethargic with soft pressures now with escalating levels of levophed      The MICU attending was prompted to come bedside as the patient was further decompensated and I contacted the interventional fellow with the CICU attending about the acute events  Bedside TTE still showed severe biventricular failure  AT that time while we were attempting to get brachial arterial line access the patient lost pulses       While running the code the patient at times  had asystole and others slow junctional rhythm PEA  She never had a shockable rhythm  She started to vomit while the MICU attending tried to intubate the patient  Multiple rounds of epi/bicarb/calcium were given while levophed was at 30 and epi 10 with IV fluids wide open  The patient was deemed not a mechanical support candidate considering her access issues and covid infection  The mother [facility]) was informed of the events and the mother wanted us to stop the code  The code was stopped after 16 minutes with MICU and CICU attendings at bedside "    Discussed with patient's significant other Benson Dela Cruz and her mother Adrian Michael via telephone  Adrian Michael reports she would be next of kin to make medical decisions for her daughter, and after discussing current situation and events of the morning at length decision was made to and CPR  Patient's mother requested the code be stopped        Disposition:      Final Diagnosis: Cardiac arrest secondary to biventricular heart failure in 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    Medical Problems     Resolved Problems  Date Reviewed: 2022   None         Date, Time and Cause of Death    Date of Death: 22  Time of Death: 11:12 AM  Preliminary Cause of Death: Cardiac arrest with pulseless electrical activity ([facility] [address] )  Entered by: Mahad Suárez[SR1 1]     Attribution     SR1 1 Domitila Tilley DO 22 11:53          Death Note:    INPATIENT DEATH NOTE  Thermon Agent 57 y o  female MRN: [id number]  [facility] Encounter: [id number]    Date, Time and Cause of Death    Date of Death: 22  Time of Death: 11:12 AM  Preliminary Cause of Death: Cardiac arrest with pulseless electrical activity ([facility] [address] )  Entered by: Lexis Suárez[SR1 1]     Attribution     SR1 1 Matty Jimenez DO 22 11:53           Patient's Information  Date of Death: 22  Pronounced by: Matty Jimenez  Did the patient's death occur in the ED?: No  Did the patient's death occur in the OR?: No  Did the patient's death occur less than 10 days post-op?: Yes (post procedure)  Did the patient's death occur within 24 hours of admission?: Yes  Was code status DNR at the time of death?: No    PHYSICAL EXAM:  Unresponsive to noxious stimuli, Spontaneous respirations absent, Breath sounds absent, Carotid pulse absent, Pupillary light reflex absent and Corneal blink reflex absent    Medical Examiner notification criteria:  Patient  within 24 hours of arrival to hospital and  within 24 hours of surgery / procedure / anesthesia   Medical Examiner's office notified?:  Yes   Medical Examiner accepted case?:  No  Name of Medical Examiner: Herminia Duckworth    Family Notification  Was the family notified?: Yes  Date Notified: 22  Time Notified: 1112  Notified by: Matty Jimenez  Name of Family Notified of Death: Cici Vernon and [id number] 70 Person Relationship to Patient: Parent  Family Notification Route: Telephone    Autopsy Options:  Decision for post-mortem examination not yet made by next of kin      Primary Service Attending Physician notified?:  Cardiology and CC physicians at bedside    Physician/Resident responsible for completing Discharge Summary:  Matty Jimenez DO

## 2022-12-21 NOTE — H&P
Interventional H&P  Nas Krause 57 y o  female MRN: [id number]  Unit/Bed#: ED-02 Encounter: [id number]      Consults  PCP: Kana Quarles MD   Outpatient Cardiologist:     History of Present Illness   Physician Requesting Consult: Dima Larkin MD  Reason for Consult / Principal Problem: STEMI    HPI:  75-year-old female her chart review chest tobacco use disorder was recently diagnosed with COVID-19 pneumonia earlier this week  She was started on Paxlovid  Per EMS reports called 911 complaining of severe epigastric abdominal pain radiating to her back and her significant other found her on the floor in pain  On arrival her EKG showed ST elevation in inferior lateral leads with ST depressions in early anterior precordial leads  MI alert was called and the emergency room contacted Dr Esperanza Caldwell prompted the patient to be sent directly to [facility] [place] [place]  The patient received 325 of aspirin and 150 mg of Brilinta  On arrival to [facility] [place] she had a manual pressure cuff of 80s over 60s and had wide open normal saline liter of fluid       Patient was brought straight to the [place] cath lab  Review of Systems  Review of system was conducted and was negative except for as stated in the HPI  Historical Information   No past medical history on file    Past Surgical History:   Procedure Laterality Date   • APPENDECTOMY       Social History     Substance and Sexual Activity   Alcohol Use Not Currently     Social History     Substance and Sexual Activity   Drug Use Not Currently     Social History     Tobacco Use   Smoking Status Former   • Types: Cigarettes   Smokeless Tobacco Never     Family History: non-contributory    Meds/Allergies   Hospital Medications:   Current Facility-Administered Medications   Medication Dose Route Frequency   • fentanyl citrate (PF) 100 MCG/2ML **ADS Override Pull**         Home Medications: (Not in a hospital admission)      Allergies   Allergen Reactions   • Penicillins Other (See Comments)       Objective   Vitals: Blood pressure (!) 88/64, pulse 77, temperature (!) 92 7 °F (33 7 °C), temperature source Tympanic, resp  rate 18, weight 58 5 kg (128 lb 15 5 oz)  Orthostatic Blood Pressures    Flowsheet Row Most Recent Value   Blood Pressure 88/64 filed at 12/21/2022 0830   Patient Position - Orthostatic VS Sitting filed at 12/21/2022 9399            Invasive Devices     Peripheral Intravenous Line  Duration           Peripheral IV 12/21/22 Left;Ventral (anterior) Forearm <1 day                Physical Exam    GEN: Ambika Chauhan appears well, alert and oriented x 3, pleasant and cooperative   HEENT:  Normocephalic, atraumatic, anicteric, moist mucous membranes  NECK: No JVD or carotid bruits   HEART: regular rhythm, bradycardic rate, normal S1 and S2, no murmurs, clicks, gallops or rubs   LUNGS: Clear to auscultation bilaterally; no wheezes, rales, or rhonchi; respiration nonlabored   ABDOMEN:  Normoactive bowel sounds, soft, no tenderness, no distention  EXTREMITIES: peripheral pulses palpable; no edema  NEURO: no gross focal findings; cranial nerves grossly intact   SKIN:  Dry, intact, warm to touch    Lab Results: I have personally reviewed pertinent lab results  Imaging: I have personally reviewed pertinent reports  Telemetry:       EKG:         Previous STRESS TEST:  No results found for this or any previous visit  No results found for this or any previous visit  No results found for this or any previous visit  Previous Cath/PCI:  No results found for this or any previous visit  No results found for this or any previous visit  No results found for this or any previous visit  ECHO:  No results found for this or any previous visit  No results found for this or any previous visit  ANGELICA:  No results found for this or any previous visit      No results found for this or any previous visit       CMR:  No results found for this or any previous visit  No results found for this or any previous visit  No results found for this or any previous visit  HOLTER  No results found for this or any previous visit  No results found for this or any previous visit  VTE Prophylaxis: Sequential compression device Charls Frees)        Assessment/Plan   58-year-old female her chart review chest tobacco use disorder was recently diagnosed with COVID-19 pneumonia earlier this week that presented with diffuse st elevation and cardiogenic shock  On arrival to the [facility] cath lab:   Femoral line and arterial access were obtained in the right groin  Initial cath showed clean coronary arteries and LV gram showed significant cardiomyopathy with a EF of probably 20%  While the patient was on the table she was also complaining of severe back pain  The aortic gram had no evidence of dissection  I performed a bedside echo that showed a severe cardiomyopathy with biventricular failure and mild pericardial effusion with no tamponade physiology  IVC was flat      The patient was hemodynamically with pressures 90s over 60s off for right femoral arterial line and cuff pressure stable in sinus with persistent ST elevation  She was not complaining of chest pain but still back pain  She was also now complaining of severe right leg pain and did not have any Doppler pulses of her right PT or DP  She did have faint Doppler pulses plus one of her left PT and DP  The patient and the case was discussed with the cardiac ICU critical care attending  The patient was planned to go to MICU 5  I moved the patient with the cardiac cath staff to MICU 5 where the cardiac critical care fellow was at bedside       , lactate acid of 14, hanna with metabolic acidosis  Concern was  for severe biventricular failure and shock possibly due to viral covid myocarditis   On arrival to the medical intensive care unit The patient was talking to me with blood pressure of 90/60 on levophed of 4 off her femoral arterial line  The patient was still complaining of severe right leg pain and had no pulses distally in her leg  Reviewing her films of her groin shot I was concerned the arterial sheath was causing occlusion  I decided to remove the arterial sheath and hold pressure  AT the same time the patient was still comfortable and becoming more lethargic with soft pressures now with escalating levels of levophed      The MICU attending was prompted to come bedside as the patient was further decompensated and I contacted the interventional fellow with the CICU attending about the acute events  Bedside TTE still showed severe biventricular failure  AT that time while we were attempting to get brachial arterial line access the patient lost pulses       While running the code the patient at times  had asystole and others slow junctional rhythm PEA  She never had a shockable rhythm  She started to vomit while the MICU attending tried to intubate the patient  Multiple rounds of epi/bicarb/calcium were given while levophed was at 30 and epi 10 with IV fluids wide open  The patient was deemed not a mechanical support candidate considering her access issues and covid infection  The mother [facility]) was informed of the events and the mother wanted us to stop the code  The code was stopped after 16 minutes with MICU and CICU attendings at bedside  Case discussed and reviewed with Dr Cecelia Nyhan who agrees with my assessment and plan  Thank you for involving us in the care of your patient  Wilber Chavira MD  Cardiology Fellow   PGY-5    ==========================================================================================    Counseling / Coordination of Care  Total floor / unit time spent today 45 minutes minutes  Greater than 50% of total time was spent with the patient and / or family counseling and / or coordination of care    A description of the counseling / coordination of care:          Epic/ Allscripts/Care Everywhere records reviewed:     ** Please Note: Fluency DirectDictation voice to text software may have been used in the creation of this document   **

## 2022-12-21 NOTE — PROGRESS NOTES
Received patient from Cardiac Cath lab, cardio fellow bedside pulling R femoral sheath d/t no pulses in RLE  Pt  Responsive, w/ diffuse mottling from the neck down  Pt  Hemodynamically unstable, manual bp 40/x  Rapidly titrating levophed per cardio fellow  MICU attending made aware and came to bedside, manual bp remained 40/x, epi gtt initiated, multiple push dose bicarb given  Pt  Progressed to PEA & agonal respirations, CPR initiated - see code documentation

## 2022-12-21 NOTE — TELEPHONE ENCOUNTER
Per the patients chart which is marked as "", she  on 22  Please remove patient from the [facility] panel

## 2022-12-21 NOTE — ED ATTENDING ATTESTATION
12/21/2022  IAnn MD, saw and evaluated the patient  I have discussed the patient with the resident/non-physician practitioner and agree with the resident's/non-physician practitioner's findings, Plan of Care, and MDM as documented in the resident's/non-physician practitioner's note, except where noted  All available labs and Radiology studies were reviewed  I was present for key portions of any procedure(s) performed by the resident/non-physician practitioner and I was immediately available to provide assistance  At this point I agree with the current assessment done in the Emergency Department  I have conducted an independent evaluation of this patient a history and physical is as follows:    Presentation concerning for acute MI in setting of active COVID, no previous cardiac history  Pt with BP 88/64 (MAP 72) but pt diaphoretic and uncomfortable appearing  No pulse differential, CXR c/w COVID but no widening of mediastinum  MI alert activated and cards requested transfer to [place] for definitive intervention  Fluids, ASA, fentanyl, brilinta given, no heparin  At this point pt best served at facility than can potentially intervene on obstructive lesion  ED Course  ED Course as of 12/21/22 1506   Wed Dec 21, 2022   9705 Presentation concerning for acute MI in setting of active COVID, no previous cardiac history  Pt with BP 88/64 (MAP 72) but pt diaphoretic and uncomfortable appearing  No pulse differential, CXR c/w COVID but no widening of mediastinum  MI alert activated and cards requested transfer to [place] for definitive intervention  Fluids, ASA, fentanyl, brilinta given, no heparin              Critical Care Time  Procedures

## 2022-12-21 NOTE — DEATH NOTE
INPATIENT DEATH NOTE  Mariusz Rodriguez 57 y o  female MRN: [id number]  Unit/Bed#: MICU 05 Encounter: [id number]    Date, Time and Cause of Death    Date of Death: 22  Time of Death: 11:12 AM  Preliminary Cause of Death: Cardiac arrest with pulseless electrical activity ([facility] [address] )  Entered by: Bill Suárez[SR1 1]     Attribution     SR1 1 Lilia Richards DO 22 11:53           Patient's Information  Date of Death: 22  Pronounced by: Lilia Richards  Did the patient's death occur in the ED?: No  Did the patient's death occur in the OR?: No  Did the patient's death occur less than 10 days post-op?: Yes (post procedure)  Did the patient's death occur within 24 hours of admission?: Yes  Was code status DNR at the time of death?: No    PHYSICAL EXAM:  Unresponsive to noxious stimuli, Spontaneous respirations absent, Breath sounds absent, Carotid pulse absent, Pupillary light reflex absent and Corneal blink reflex absent    Medical Examiner notification criteria:  Patient  within 24 hours of arrival to hospital and  within 24 hours of surgery / procedure / anesthesia   Medical Examiner's office notified?:  Yes   Medical Examiner accepted case?:  No  Name of Medical Examiner: Fanny Fisher    Family Notification  Was the family notified?: Yes  Date Notified: 22  Time Notified: 1112  Notified by: Lilia Richards  Name of Family Notified of Death: Esau Nagy and [address] Person Relationship to Patient: Parent  Family Notification Route: Telephone    Autopsy Options:  Decision for post-mortem examination not yet made by next of kin      Primary Service Attending Physician notified?:  Cardiology and CC physicians at bedside    Physician/Resident responsible for completing Discharge Summary:  Lilia Richards DO

## 2022-12-21 NOTE — PROGRESS NOTES
CODE NOTE     49-year-old female presented with acute cardiomyopathy, COVID-19 positive likely viral myocarditis noted no obstructive CAD on cardiac cath  Patient arrived to the ICU in cardiogenic shock  Initially morning, awake same 1-2 words  Extremities are mottled/cyanosed with difficulty obtaining blood pressure manually  Initial assessment/BP 50s/30s, was put in trendelenberg position  Cardiology fellow performed bedside TTE which continue to show severe depressed EF function/with apical ballooning  Immediate call placed to the cardiac CCU team Dr Amanda Aleman for emergent need of cardiac mechanical support  CT surgery/cardiology were contacted, patient deemed not a candidate for advanced mechanical cardiac support given her poor vasculature  Shortly after initial evaluation patient went into a PEA arrest   ACLS measures  I suspect reported pH of 7 8 before the event, she was given 3 ampoules of sodium bicarbonate and additional 5 PEA arrest   She was intubated, ACLS measures continued until the patient's mother who is a healthcare representative asked to stop the resuscitation measures this administered the patient's wishes and also due to advanced cardiac disease, myocarditis/cardiogenic shock  Pls refer to the nursing code note for more details

## 2022-12-22 LAB
ATRIAL RATE: 122 BPM
ATRIAL RATE: 78 BPM
P AXIS: 79 DEGREES
PR INTERVAL: 0 MS
PR INTERVAL: 182 MS
QRS AXIS: 123 DEGREES
QRS AXIS: 96 DEGREES
QRSD INTERVAL: 63 MS
QRSD INTERVAL: 70 MS
QT INTERVAL: 321 MS
QT INTERVAL: 344 MS
QTC INTERVAL: 392 MS
QTC INTERVAL: 458 MS
T WAVE AXIS: 62 DEGREES
T WAVE AXIS: 71 DEGREES
VENTRICULAR RATE: 122 BPM
VENTRICULAR RATE: 78 BPM

## 2022-12-22 NOTE — UTILIZATION REVIEW
NOTIFICATION OF INPATIENT ADMISSION   AUTHORIZATION REQUEST   SERVICING FACILITY:   [facility]  Address: [address] [place] [address], [address] [place] [address]  Tax ID: [id number]  NPI: [id number] ATTENDING PROVIDER:  Attending Name and NPI#: Isrrael Ricci Md [[id number]]  Address: [address] [place] [address]  Phone: [phone]   ADMISSION INFORMATION:  Place of Service: [address] [place] [address] Code: 21  Inpatient Admission Date/Time: 12/21/22  8:52 AM  Discharge Date/Time: 12/21/2022  3:15 PM  Admitting Diagnosis Code/Description:  STEMI (ST elevation myocardial infarction) ([facility] [address] ) [I21 3]     UTILIZATION REVIEW CONTACT:  Celesta Alpha, Utilization   Network Utilization Review Department  Phone: [phone]  Fax: [phone]  Email: [email or web address]  Contact for approvals/pending authorizations, clinical reviews, and discharge  PHYSICIAN ADVISORY SERVICES:  Medical Necessity Denial & Idxl-nx-Xgkv Review  Phone: [phone]  Fax: [phone]  Email: [email or web address]

## 2022-12-22 NOTE — UTILIZATION REVIEW
Initial Clinical Review    Admission: Date/Time/Statement:   Admission Orders (From admission, onward)     Ordered        12/21/22 0951  Inpatient Admission  Once                      Orders Placed This Encounter   Procedures   • Inpatient Admission     Standing Status:   Standing     Number of Occurrences:   1     Order Specific Question:   Level of Care     Answer:   Critical Care [15]     Order Specific Question:   Estimated length of stay     Answer:   More than 2 Midnights     Order Specific Question:   Certification     Answer:   I certify that inpatient services are medically necessary for this patient for a duration of greater than two midnights  See H&P and MD Progress Notes for additional information about the patient's course of treatment  Initial Presentation: 57 y o  female with PMH of tobacco use disorder, recent dx for COVID 19 2 days ago on Paxlovid  was transferred from [facility] to [facility] for a higher level of care  Pt initially presented to [facility] w/ chest pain in am of 12/21 associated w/ nausea and diaphoresis  EKG demonstrated ST elevation in the inferior lateral leads with ST depression in the posterior leads and an MI alert was called  Loaded w/  mg and Brilinta  CXR demonstrated multifocal pneumonia 2/2 COVID but no mediastinal widening  Transferred to [facility] urgently  Given 1L NS and 25 mg Fentanyl prior to transfer  Upon arrival to [facility], pt had a manual BP of 80s over 60s and had wide open normal saline liter of fluid  Still having chest, back and abdominal pain  Mentation intact  Plan: Inpatient admission for evaluation and treatment of STEMI: taken straight to cath lab w/ cardiology  In the cath lab, fem and annie was placed   Initial cath showed clean coronary arteries LV gram showed significant cardiomyopathy with a EF of probably 20%   While the patient was on the table she was also c/o severe back pain   The aortic gram with no evidence of dissection   Bedside echo showed a severe cardiomyopathy with biventricular failure and mild pericardial effusion with no tamponade physiology  Yoana Myers was flat  Also c/o  severe right leg pain and did not have any Doppler pulses of her right PT or DP   She did have faint Doppler pulses plus one of her left PT and DP  She was transferred to MICU, , lactate acid of 14, hanna with metabolic acidosis, concern for severe biventricular failure possibly due to viral covid myocarditis  On Levo  Still c/o severe right leg pain and had no pulses distally in her leg, arterial sheath was removed d/t concern that was causing an occlusion  She became lethargic w/ soft BP , Levo escalated  Bedside TTE still showed severe biventricular failure  She further decompensated and lost pulses, at times had asystole and others slow junctional rhythm PEA  No shockable rhythm  Started to vomit, tried to intubate, multiple rounds of epi/bicarb/calcium were given while levophed was at 30 and epi 10 with IV fluids wide open  Patient was deemed not a mechanical support candidate considering her access issues and covid infection  The mother [facility]) was informed of the events and the mother wanted to stop the code  The code was stopped after 16 minutes with MICU and CICU attendings at bedside      Date, Time and Cause of Death    Date of Death: 12/21/22  Time of Death: 11:12 AM  Preliminary Cause of Death: Cardiac arrest with pulseless electrical activity [facility])    ED Triage Vitals   Temp Pulse Resp BP SpO2   -- 12/21/22 1051 -- -- 12/21/22 1051    (!) 120   (!) 56 %      Temp src Heart Rate Source Patient Position - Orthostatic VS BP Location FiO2 (%)   -- -- -- -- --             Pain Score       12/21/22 0853       10 - Worst Possible Pain          Wt Readings from Last 1 Encounters:   12/21/22 58 5 kg (128 lb 15 5 oz)     Additional Vital Signs:   Date/Time Pulse SpO2 O2 Flow Rate (L/min) O2 Device   12/21/22 1114 0 Abnormal  -- -- --   12/21/22 1106 76 -- -- -- 12/21/22 1104 -- 56 % Abnormal  -- --   12/21/22 1103 116 Abnormal  21 % Abnormal  -- --   12/21/22 1100 116 Abnormal  62 % Abnormal  -- --   12/21/22 1057 90 42 % Abnormal  -- --   12/21/22 1054 102 54 % Abnormal  -- --   12/21/22 1051 120 Abnormal  56 % Abnormal  -- --       Pertinent Labs/Diagnostic Test Results:   12/21   CXR: Mild pulmonary edema     Superimposed bibasilar small infiltrates    Cardiac Cath:   Nonobstructive CAD w/ mid RCA 40% lesion not consistent with STEMI pattern on ECG  •  Severely reduced LVEF 30% with mid segment akinesis & apical hypokinesis  •  No angiographic evidence for ascending dissection or aortic valve disease  •  Significant PVD in R femoral/ext  iliac aa   noted on access  •  Small pericardial effusion on bedside echo     Differential Dx: fulminant myocarditis (possibly 2/2 COVID), stress-induced cardiomyopathy, sepsis/distributive shock myocardial dysfunction     Recommendation:   Admit to ICU  Arterial sheath removal at bedside () d/t right leg paresthesias & non-identifiable pulses by Doppler  Stat echo for tamponade evaluation  Undetermined rhythm  Rightward axis  Low voltage QRS  EKG result:       Results from last 7 days   Lab Units 12/21/22  1037 12/21/22  0934 12/21/22  0911 12/21/22  0855   WBC Thousand/uL  --   --   --  10 37*   HEMOGLOBIN g/dL  --   --   --  13 8   I STAT HEMOGLOBIN g/dl 12 6 10 5* 14 6  --    HEMATOCRIT %  --   --   --  44 0   HEMATOCRIT, ISTAT % 37 31* 43  --    PLATELETS Thousands/uL  --   --   --  251         Results from last 7 days   Lab Units 12/21/22  1037 12/21/22  0934 12/21/22  0911 12/21/22  0855   SODIUM mmol/L  --   --   --  127*   POTASSIUM mmol/L  --   --   --  4 4   CHLORIDE mmol/L  --   --   --  95*   CO2 mmol/L  --   --   --  10*   CO2, I-STAT mmol/L 14* 14* 7*  --    ANION GAP mmol/L  --   --   --  22*   BUN mg/dL  --   --   --  26*   CREATININE mg/dL  --   --   --  2 08*   EGFR ml/min/1 73sq m  --   --   --  24   CALCIUM mg/dL  --   --   --  8 2*   CALCIUM, IONIZED, ISTAT mmol/L 1 04* 1 00* 1 14  --    MAGNESIUM mg/dL  --   --   --  2 8*     Results from last 7 days   Lab Units 12/21/22  0855   AST U/L 124*   ALT U/L 58   ALK PHOS U/L 77   TOTAL PROTEIN g/dL 4 9*   ALBUMIN g/dL 2 2*   TOTAL BILIRUBIN mg/dL 0 30         Results from last 7 days   Lab Units 12/21/22  0855   GLUCOSE RANDOM mg/dL 472*         Results from last 7 days   Lab Units 12/21/22  1028   HEMOGLOBIN A1C % 5 5   EAG mg/dl 111       Results from last 7 days   Lab Units 12/21/22  1037 12/21/22  0934 12/21/22  0911   PH, JOHAN I-STAT  6 889*  --  7 024*   PCO2, JOHAN ISTAT mm HG 61 9*  --  24 0*   PO2, JOHAN ISTAT mm HG 31 0*  --  221 0*   HCO3, JOHAN ISTAT mmol/L 11 8*  --  6 3*   I STAT BASE EXC mmol/L -22* -14* -23*   I STAT O2 SAT % 27* 98* 99*   ISTAT PH ART   --  7 213*  --    I STAT ART PCO2 mm HG  --  31 8*  --    I STAT ART PO2 mm HG  --  118 0  --    I STAT ART HCO3 mmol/L  --  12 8*  --      Results from last 7 days   Lab Units 12/21/22  0855   CK TOTAL U/L 612*   CK MB INDEX % 18 9*   CK MB ng/mL 115 5*     Results from last 7 days   Lab Units 12/21/22  0855   HS TNI 0HR ng/L >22,973*         Results from last 7 days   Lab Units 12/21/22  0855   PROTIME seconds 14 9*   INR  1 15             Results from last 7 days   Lab Units 12/21/22  0855   LACTIC ACID mmol/L 14 1*       ED Treatment:   Medication Administration - No Administrations Displayed (No Start Event Found)     None        History reviewed  No pertinent past medical history    Present on Admission:  **None**      Admitting Diagnosis: STEMI (ST elevation myocardial infarction) ([facility]) [I21 3]  Age/Sex: 57 y o  female  Admission Orders:  Scheduled Medications:      Continuous IV Infusions:  sodium chloride 0 9 % infusion  Rate: 125 mL/hr Dose: 125 mL/hr  Freq: Continuous Route: IV  Last Dose: 125 mL/hr (12/21/22 1010)  Start: 12/21/22 0900 End: 12/21/22 1409  norepinephrine (LEVOPHED) 4 mg (STANDARD CONCENTRATION) IV in sodium chloride 0 9% 250 mL  Rate: 3 8-112 5 mL/hr Dose: 1-30 mcg/min  Freq: Titrated Route: IV  Last Dose: 50 mcg/min (12/21/22 1040)  Start: 12/21/22 0945 End: 12/21/22 1716  EPINEPHrine 3000 mcg (STANDARD CONCENTRATION) IV in sodium chloride 0 9% 250 mL  Rate: 5-50 mL/hr Dose: 1-10 mcg/min  Freq: Titrated Route: IV  Last Dose: 10 mcg/min (12/21/22 1035)  Start: 12/21/22 1045 End: 12/21/22 1716    PRN Meds:      None    Network Utilization Review Department  ATTENTION: Please call with any questions or concerns to [phone] and carefully listen to the prompts so that you are directed to the right person  All voicemails are confidential   Sha Santiago all requests for admission clinical reviews, approved or denied determinations and any other requests to dedicated fax number below belonging to the campus where the patient is receiving treatment   List of dedicated fax numbers for the Facilities:  [address] DENIALS (Administrative/Medical Necessity) [phone]   [address] (Maternity/NICU/Pediatrics) [phone]   [address] [phone]   [facility] 77 [phone]   [address] [place] [address] [facility] [phone]   [address] [place] [address] 815 [facility] [phone]

## 2022-12-28 NOTE — QUICK NOTE
Discharge Summary Clarification:    Patient was initially a STEMI alert arriving to [facility]  She underwent cardiac cath, which noted no obstructive CAD (ruling out STEMI), however, noted to have severely reduced EF indicating form of stress induced cardiomyopathy  Unfortunately, on arrival to MICU patient hemodynamically decompensated 2/2 severe cardiomyopathy from fulminant myocarditis presumed to be caused by COVID19 infection  She went into cardiac arrest due to above, and CPR was ultimately stopped at the request of family        Alessia Upton, PGY-3  Internal Medicine Resident  [facility]

## 2022-12-28 NOTE — TELEPHONE ENCOUNTER
22 4:09 PM     The office's request has been received  Per PCP Guidelines, the office is to complete the appropriate portion of the  Workflow and remove PCP at the office level and removal request is not required       Thank you  Bjial Madden

## 2023-01-24 DIAGNOSIS — J30.9 ALLERGIC RHINITIS, UNSPECIFIED SEASONALITY, UNSPECIFIED TRIGGER: ICD-10-CM

## 2023-01-24 RX ORDER — MONTELUKAST SODIUM 10 MG/1
TABLET ORAL
Qty: 90 TABLET | Refills: 1 | OUTPATIENT
Start: 2023-01-24

## (undated) DEVICE — DGW .035 FC J3MM 260CM TEF: Brand: EMERALD

## (undated) DEVICE — Device

## (undated) DEVICE — GUIDEWIRE WHOLEY HI TORQUE INTERM MOD J .035 145CM

## (undated) DEVICE — PINNACLE INTRODUCER SHEATH: Brand: PINNACLE

## (undated) DEVICE — CATH DIAG 5FR IMPULSE 110CM 6S PIG 145 ANG

## (undated) DEVICE — CATH GUIDE LAUNCHER 6FR JR 4.0

## (undated) DEVICE — CATH DIAG 6FR IMPULSE 100CM FL4

## (undated) DEVICE — CATH DIAG 6FR IMPULSE 100CM FR4